# Patient Record
Sex: MALE | Race: WHITE | NOT HISPANIC OR LATINO | Employment: FULL TIME | URBAN - METROPOLITAN AREA
[De-identification: names, ages, dates, MRNs, and addresses within clinical notes are randomized per-mention and may not be internally consistent; named-entity substitution may affect disease eponyms.]

---

## 2022-11-21 ENCOUNTER — TELEPHONE (OUTPATIENT)
Dept: CARDIOLOGY | Facility: MEDICAL CENTER | Age: 22
End: 2022-11-21
Payer: OTHER GOVERNMENT

## 2022-11-21 NOTE — TELEPHONE ENCOUNTER
Attempted to call patient to request records for NP appointment with [facility]. Called to confirm if this will be first time patient is seeing a cardiologist and if the patient has had any recent cardiac imaging, testing, or lab work done outside of [facility]. No answer, unable to LVM on both phone numbers on chart.

## 2022-12-02 ENCOUNTER — OFFICE VISIT (OUTPATIENT)
Dept: CARDIOLOGY | Facility: PHYSICIAN GROUP | Age: 22
End: 2022-12-02
Payer: OTHER GOVERNMENT

## 2022-12-02 VITALS
WEIGHT: 132.6 LBS | HEART RATE: 84 BPM | HEIGHT: 69 IN | DIASTOLIC BLOOD PRESSURE: 70 MMHG | BODY MASS INDEX: 19.64 KG/M2 | RESPIRATION RATE: 16 BRPM | OXYGEN SATURATION: 97 % | SYSTOLIC BLOOD PRESSURE: 104 MMHG

## 2022-12-02 DIAGNOSIS — R07.9 CHEST PAIN, UNSPECIFIED TYPE: ICD-10-CM

## 2022-12-02 PROCEDURE — 99204 OFFICE O/P NEW MOD 45 MIN: CPT | Performed by: INTERNAL MEDICINE

## 2022-12-02 RX ORDER — COLCHICINE 0.6 MG/1
0.6 TABLET ORAL DAILY
Qty: 90 TABLET | Refills: 0 | Status: SHIPPED | OUTPATIENT
Start: 2022-12-02 | End: 2023-06-01

## 2022-12-02 RX ORDER — IBUPROFEN 400 MG/1
400 TABLET ORAL 3 TIMES DAILY
Qty: 45 TABLET | Refills: 1 | Status: SHIPPED | OUTPATIENT
Start: 2022-12-02 | End: 2023-06-01

## 2022-12-02 ASSESSMENT — ENCOUNTER SYMPTOMS
DECREASED APPETITE: 0
NAUSEA: 0
PND: 0
NEAR-SYNCOPE: 0
FLANK PAIN: 0
WEIGHT GAIN: 0
BLURRED VISION: 0
SYNCOPE: 0
IRREGULAR HEARTBEAT: 0
HEARTBURN: 0
DIZZINESS: 0
VOMITING: 0
BACK PAIN: 0
SHORTNESS OF BREATH: 0
ORTHOPNEA: 0
COUGH: 0
DEPRESSION: 0
WEIGHT LOSS: 0
PALPITATIONS: 0
ALTERED MENTAL STATUS: 0
FEVER: 0
CLAUDICATION: 0
CONSTIPATION: 0
ABDOMINAL PAIN: 0
DIARRHEA: 0
DYSPNEA ON EXERTION: 0

## 2022-12-02 NOTE — PROGRESS NOTES
Cardiology Note    Chief Complaint   Patient presents with    Chest Pain       History of Present Illness: Jimmy James is a 22 y.o. male who presents for initial visit.    ED visit [facility] 11/2022 for dizziness and chest pain.     Describes central chest pain. Started about a year ago. Happens about weekly. Was going to follow up with cardiology but had other  obligations. Latest episode much worse compared to prior. [facility]. Stretching made tension worse and increased pain. Hunching over lessened the pain. Laying down increased pain. Denies sick contacts. No recent illness. No association with food. Nonexertional. Denies toxic social habits. No relevant family history.     Review of Systems   Constitutional: Negative for decreased appetite, fever, malaise/fatigue, weight gain and weight loss.   HENT:  Negative for congestion and nosebleeds.    Eyes:  Negative for blurred vision.   Cardiovascular:  Positive for chest pain. Negative for claudication, dyspnea on exertion, irregular heartbeat, leg swelling, near-syncope, orthopnea, palpitations, paroxysmal nocturnal dyspnea and syncope.   Respiratory:  Negative for cough and shortness of breath.    Endocrine: Negative for cold intolerance and heat intolerance.   Skin:  Negative for rash.   Musculoskeletal:  Negative for back pain.   Gastrointestinal:  Negative for abdominal pain, constipation, diarrhea, heartburn, melena, nausea and vomiting.   Genitourinary:  Negative for dysuria, flank pain and hematuria.   Neurological:  Negative for dizziness.   Psychiatric/Behavioral:  Negative for altered mental status and depression.        History reviewed. No pertinent past medical history.      History reviewed. No pertinent surgical history.      Current Outpatient Medications   Medication Sig Dispense Refill    ibuprofen (MOTRIN) 400 MG Tab Take 1 Tablet by mouth in the morning, at noon, and at bedtime. 45 Tablet 1    colchicine (COLCRYS) 0.6  "MG Tab Take 1 Tablet by mouth every day. 90 Tablet 0     No current facility-administered medications for this visit.         No Known Allergies      History reviewed. No pertinent family history.      Social History     Socioeconomic History    Marital status: Single     Spouse name: Not on file    Number of children: Not on file    Years of education: Not on file    Highest education level: Not on file   Occupational History    Not on file   Tobacco Use    Smoking status: Never    Smokeless tobacco: Never   Substance and Sexual Activity    Alcohol use: Never    Drug use: Never    Sexual activity: Not on file   Other Topics Concern    Not on file   Social History Narrative    Not on file     Social Determinants of Health     Financial Resource Strain: Not on file   Food Insecurity: Not on file   Transportation Needs: Not on file   Physical Activity: Not on file   Stress: Not on file   Social Connections: Not on file   Intimate Partner Violence: Not on file   Housing Stability: Not on file         Physical Exam:  Ambulatory Vitals  /70 (BP Location: Left arm, Patient Position: Sitting, BP Cuff Size: Adult)   Pulse 84   Resp 16   Ht 1.753 m (5' 9\")   Wt 60.1 kg (132 lb 9.6 oz)   SpO2 97%    BP Readings from Last 4 Encounters:   12/02/22 104/70     Weight/BMI:   Vitals:    12/02/22 1100   BP: 104/70   Weight: 60.1 kg (132 lb 9.6 oz)   Height: 1.753 m (5' 9\")    Body mass index is 19.58 kg/m².  Wt Readings from Last 4 Encounters:   12/02/22 60.1 kg (132 lb 9.6 oz)       Physical Exam  Constitutional:       General: He is not in acute distress.  HENT:      Head: Normocephalic and atraumatic.   Eyes:      Conjunctiva/sclera: Conjunctivae normal.      Pupils: Pupils are equal, round, and reactive to light.   Neck:      Vascular: No JVD.   Cardiovascular:      Rate and Rhythm: Normal rate and regular rhythm.      Heart sounds: Normal heart sounds. No murmur heard.    No friction rub. No gallop.   Pulmonary:      " Effort: Pulmonary effort is normal. No respiratory distress.      Breath sounds: Normal breath sounds. No wheezing or rales.   Chest:      Chest wall: No tenderness.   Abdominal:      General: Bowel sounds are normal. There is no distension.      Palpations: Abdomen is soft.   Musculoskeletal:      Cervical back: Normal range of motion and neck supple.   Skin:     General: Skin is warm and dry.   Neurological:      Mental Status: He is alert and oriented to person, place, and time.   Psychiatric:         Mood and Affect: Affect normal.         Judgment: Judgment normal.       Lab Data Review:  No results found for: CHOLSTRLTOT, LDL, HDL, TRIGLYCERIDE    No results found for: SODIUM, POTASSIUM, CHLORIDE, CO2, GLUCOSE, BUN, CREATININE, BUNCREATRAT, GLOMRATE  CrCl cannot be calculated (No successful lab value found.).  No results found for: ALKPHOSPHAT, ASTSGOT, ALTSGPT, TBILIRUBIN   No results found for: WBC, HCT  No results found for: HBA1C  No components found for: TROP      Cardiac Imaging and Procedures Review:      EKG 12/2/22 interpreted by me sinus, early repol, right axis    Medical Decision Making:  Problem List Items Addressed This Visit       Chest pain    Relevant Orders    EKG    EC-ECHOCARDIOGRAM COMPLETE W/O CONT    Sed Rate    CRP QUANTITIVE (NON-CARDIAC)    CBC WITH DIFFERENTIAL    Comp Metabolic Panel    TSH WITH REFLEX TO FT4     Chest pain suspect pericarditis based on history. Check inflammatory markers, labs and echocardiogram. Nsaids two weeks and colchicine three months.     It was my pleasure to meet with  Jacob.

## 2022-12-06 ENCOUNTER — HOSPITAL ENCOUNTER (OUTPATIENT)
Dept: CARDIOLOGY | Facility: MEDICAL CENTER | Age: 22
End: 2022-12-06
Attending: INTERNAL MEDICINE
Payer: OTHER GOVERNMENT

## 2022-12-06 DIAGNOSIS — R07.9 CHEST PAIN, UNSPECIFIED TYPE: ICD-10-CM

## 2022-12-06 PROCEDURE — 93306 TTE W/DOPPLER COMPLETE: CPT

## 2022-12-07 LAB — LV EJECT FRACT  99904: 55

## 2022-12-07 PROCEDURE — 93306 TTE W/DOPPLER COMPLETE: CPT | Mod: 26 | Performed by: INTERNAL MEDICINE

## 2023-05-24 ENCOUNTER — TELEPHONE (OUTPATIENT)
Dept: CARDIOLOGY | Facility: MEDICAL CENTER | Age: 23
End: 2023-05-24
Payer: OTHER GOVERNMENT

## 2023-06-01 ENCOUNTER — OFFICE VISIT (OUTPATIENT)
Dept: CARDIOLOGY | Facility: PHYSICIAN GROUP | Age: 23
End: 2023-06-01

## 2023-06-01 VITALS
RESPIRATION RATE: 12 BRPM | WEIGHT: 133 LBS | DIASTOLIC BLOOD PRESSURE: 68 MMHG | HEART RATE: 82 BPM | OXYGEN SATURATION: 98 % | HEIGHT: 69 IN | SYSTOLIC BLOOD PRESSURE: 90 MMHG | BODY MASS INDEX: 19.7 KG/M2

## 2023-06-01 DIAGNOSIS — Z00.00 HEALTHCARE MAINTENANCE: ICD-10-CM

## 2023-06-01 PROBLEM — R07.9 CHEST PAIN: Status: RESOLVED | Noted: 2022-12-02 | Resolved: 2023-06-01

## 2023-06-01 PROCEDURE — 3074F SYST BP LT 130 MM HG: CPT | Performed by: INTERNAL MEDICINE

## 2023-06-01 PROCEDURE — 99214 OFFICE O/P EST MOD 30 MIN: CPT | Performed by: INTERNAL MEDICINE

## 2023-06-01 PROCEDURE — 3078F DIAST BP <80 MM HG: CPT | Performed by: INTERNAL MEDICINE

## 2023-06-01 ASSESSMENT — ENCOUNTER SYMPTOMS
PALPITATIONS: 0
COUGH: 0
WEIGHT LOSS: 0
WEIGHT GAIN: 0
ALTERED MENTAL STATUS: 0
PND: 0
DYSPNEA ON EXERTION: 0
NEAR-SYNCOPE: 0
SHORTNESS OF BREATH: 0
CONSTIPATION: 0
HEARTBURN: 0
SYNCOPE: 0
CLAUDICATION: 0
BLURRED VISION: 0
FLANK PAIN: 0
DIARRHEA: 0
NAUSEA: 0
IRREGULAR HEARTBEAT: 0
DEPRESSION: 0
ABDOMINAL PAIN: 0
BACK PAIN: 0
VOMITING: 0
FEVER: 0
ORTHOPNEA: 0
DIZZINESS: 0
DECREASED APPETITE: 0

## 2023-06-01 NOTE — PROGRESS NOTES
Cardiology Note    Chief Complaint   Patient presents with    Follow-Up     [facility] Dx: Chest pain, unspecified type       History of Present Illness: Jimmy James is a 23 y.o. male who presents for follow up visit.    ED visit [facility] 11/2022 for dizziness and chest pain.     Symptoms now much less frequent and shorter. Did improve with nsaids. Cardiac testing below within normal limits. No other cardiovascular complaints.     Review of Systems   Constitutional: Negative for decreased appetite, fever, malaise/fatigue, weight gain and weight loss.   HENT:  Negative for congestion and nosebleeds.    Eyes:  Negative for blurred vision.   Cardiovascular:  Negative for chest pain, claudication, dyspnea on exertion, irregular heartbeat, leg swelling, near-syncope, orthopnea, palpitations, paroxysmal nocturnal dyspnea and syncope.   Respiratory:  Negative for cough and shortness of breath.    Endocrine: Negative for cold intolerance and heat intolerance.   Skin:  Negative for rash.   Musculoskeletal:  Negative for back pain.   Gastrointestinal:  Negative for abdominal pain, constipation, diarrhea, heartburn, melena, nausea and vomiting.   Genitourinary:  Negative for dysuria, flank pain and hematuria.   Neurological:  Negative for dizziness.   Psychiatric/Behavioral:  Negative for altered mental status and depression.          History reviewed. No pertinent past medical history.      History reviewed. No pertinent surgical history.      No current outpatient medications on file.     No current facility-administered medications for this visit.         No Known Allergies      History reviewed. No pertinent family history.      Social History     Socioeconomic History    Marital status: Single     Spouse name: Not on file    Number of children: Not on file    Years of education: Not on file    Highest education level: Not on file   Occupational History    Not on file   Tobacco Use    Smoking status: Never    Smokeless  "tobacco: Never   Substance and Sexual Activity    Alcohol use: Never    Drug use: Never    Sexual activity: Not on file   Other Topics Concern    Not on file   Social History Narrative    Not on file     Social Determinants of Health     Financial Resource Strain: Not on file   Food Insecurity: Not on file   Transportation Needs: Not on file   Physical Activity: Not on file   Stress: Not on file   Social Connections: Not on file   Intimate Partner Violence: Not on file   Housing Stability: Not on file         Physical Exam:  Ambulatory Vitals  BP 90/68 (BP Location: Left arm, Patient Position: Sitting, BP Cuff Size: Adult)   Pulse 82   Resp 12   Ht 1.753 m (5' 9\")   Wt 60.3 kg (133 lb)   SpO2 98%    BP Readings from Last 4 Encounters:   06/01/23 90/68   12/02/22 104/70     Weight/BMI:   Vitals:    06/01/23 0952   BP: 90/68   Weight: 60.3 kg (133 lb)   Height: 1.753 m (5' 9\")    Body mass index is 19.64 kg/m².  Wt Readings from Last 4 Encounters:   06/01/23 60.3 kg (133 lb)   12/02/22 60.1 kg (132 lb 9.6 oz)       Physical Exam  Constitutional:       General: He is not in acute distress.  HENT:      Head: Normocephalic and atraumatic.   Eyes:      Conjunctiva/sclera: Conjunctivae normal.      Pupils: Pupils are equal, round, and reactive to light.   Neck:      Vascular: No JVD.   Cardiovascular:      Rate and Rhythm: Normal rate and regular rhythm.      Heart sounds: Normal heart sounds. No murmur heard.     No friction rub. No gallop.   Pulmonary:      Effort: Pulmonary effort is normal. No respiratory distress.      Breath sounds: Normal breath sounds. No wheezing or rales.   Chest:      Chest wall: No tenderness.   Abdominal:      General: Bowel sounds are normal. There is no distension.      Palpations: Abdomen is soft.   Musculoskeletal:      Cervical back: Normal range of motion and neck supple.   Skin:     General: Skin is warm and dry.   Neurological:      Mental Status: He is alert and oriented to " person, place, and time.   Psychiatric:         Mood and Affect: Affect normal.         Judgment: Judgment normal.         Lab Data Review:  No results found for: CHOLSTRLTOT, LDL, HDL, TRIGLYCERIDE    No results found for: SODIUM, POTASSIUM, CHLORIDE, CO2, GLUCOSE, BUN, CREATININE, BUNCREATRAT, GLOMRATE  CrCl cannot be calculated (No successful lab value found.).  No results found for: ALKPHOSPHAT, ASTSGOT, ALTSGPT, TBILIRUBIN   No results found for: WBC, HCT  No results found for: HBA1C  No components found for: TROP    Outside labs 12/2022 [facility]  -cbc wnl, cmp wnl, tsh wnl, crp wnl, esr wnl    Cardiac Imaging and Procedures Review:      EKG 12/2/22 interpreted by me sinus, early repol, right axis    TTE 12/6/22  CONCLUSIONS  Normal transthoracic echocardiogram.     Medical Decision Making:  Problem List Items Addressed This Visit       Healthcare maintenance     Symptoms much improved. Can continue nsaids prn. No further cardiac testing. Follow up with primary care physician for age appropriate screening when warranted. Can return to work without limitations.     It was my pleasure to meet with Mr. James.